# Patient Record
(demographics unavailable — no encounter records)

---

## 2019-01-08 NOTE — NUR
ROOM AIR, NO RESP DISTRESS. RIGHT GROIN 5F EXOSEAL CDI, NO BLEEDING
OR HEMATOAM NOTED. NO C/O PAIN OR NAUSEA. VSS. FAMILY AT BEDSIDE,
CALL LIGHT WITHIN REACH.

## 2019-01-08 NOTE — NUR
TAKEN OUT VIA WHEELCHAIR BY CATH TEAM MEMBER. LEFT FACILITY WITH
FAMILY AND ALL PERSONAL BELONGINGS.

## 2019-01-08 NOTE — NUR
VOIDED 600CC INTO BEDPAN. RIGHT GROIN 5F EXOSEAL CDI, NO BLEEDING OR
HEMATOMA NOTED. NO C/O AT THIS TIME. VSS. WILL CONTINUE TO MONITOR.

## 2019-01-08 NOTE — NUR
HOB ELEVATED 30 DEGREES. RIGHT GROIN 5F EXOSEAL CDI, NO BLEEDING
NOTED. SIPPING ON DRINK AND EATING SANDWICH WITH NO C/O NAUSEA. VSS.
CALL LIGHT WITHIN REACH.

## 2019-01-08 NOTE — HEMODYNAMI
PATIENT:TELLY PRATER                                  MEDICAL RECORD: [id number]
: 04/15/60                                            LOCATION:D.CAT          
ACCT# [id number]                                       ADMISSION DATE: 19
 
 
 Generatedon:201914:55
Patient name: TELLY PRATER Patient #: [id number] Visit #: [id number] SSN: : 4
/15/1960 Date
of study: 2019
Page: Of
Hemodynamic Procedure Report
****************************
Patient Data
Patient Demographics
Procedure consent was obtained
First Name: TELLY           Gender: Female
Last Name: MOI             : 1960
[facility] Initial: BRYANT        Age: 58 year(s)
Patient #: [id number]       Race: Unknown
Visit #: [id number]
Accession #:
[id number]
Additional ID: 
Contact details
Address: [address] [place] [address]     Phone: [phone]
State: [place]
City: [place]
Zip code: [address]
Past Medical History
Allergies
Allergen        Reaction        Date         Comments
Reported
Other allergy                   2019     SULFA, MORPHINE,
NICOTINE
Admission
Admission Data
Admission Date: 2019    Admission Time: 11:34
Height (in.): 65            BSA: 1.73 (m2)
Height (cm.): 165.1         BMI: 24.3 (kg/m2)
Weight (lbs.): 146
Weight (kg.): 66.22
Lab Results
Lab Result Date: 2019   Lab Result Time: 0:00
Biochemistry
Name         Units    Result                Min      Max
BUN          mg/dl    13       --(--*-)--   7        18
Creatinine   mg/dl    1        --(--*-)--   0.6      1.3
CBC
Name         Units    Result                Min      Max
Hemoglobin   g/dl     14.1     --(*---)--   13.5     17.5
Procedure
Procedure Types
Cath Procedure
Diagnostic Procedure
LHC
LHC w/Coronaries w/Grafts
Sedation Charges
Moderate Sedation up to 15 minutes
Procedure Description
 
Procedure Date
Procedure Date: 2019
Procedure Start Time: 14:32
Procedure End Time: 14:50
Procedure Staff
Name                            Function
Bebo Pulliam MD                   Performing Physician
Silvia Willett RT               Monitor
Homer Hunt RT                  Scrub
Sophie Mock RN                  Nurse
Procedure Data
Cath Procedure
Fluoroscopy
Diagnostic fluoroscopy      Total fluoroscopy Time: 3.7
time: 3.7 min               min
Diagnostic fluoroscopy      Total fluoroscopy dose: 701
dose: 701 mGy               mGy
Contrast Material
Contrast Material Type                       Amount (ml)
Isovue 300                                   121
Entry Location
Entry     Primary  Successful  Side  Size  Upsize Upsize Entry    Closure Succes
sful  Closure
Location                             (Fr)  1 (Fr) 2 (Fr) Remarks  Device        
      Remarks
Femoral                        Right 5 Fr                         Exoseal
artery
Estimated blood loss: 5 ml
Diagnostic catheters
Device Type               Used For           End Catheter
Placement
MULTIPACK JL 4.0 5Fr      Procedure
catheter
DIAGNOSTIC AR MOD 5Fr     Procedure
Catheter (783389X)
DIAGNOSTIC IM 5Fr         Procedure
catheter (673357S)
MULTIPACK Pigtail 5 Fr    Procedure
catheter
MULTIPACK JL 4.0 5Fr      Procedure
catheter
Procedure Complications
No complications
Procedure Medications
Medication           Administration Route Dosage
Oxygen               etCO2 Nasal cannula  2 l/min
Lidocaine 2%         added to field       20
Heparin Flush Bag    added to field       2 bags
(1000units/500ml NS)
0.9% NaCl            I.V.                 100 ml/hr
Versed               I.V.                 2 mg
Fentanyl             I.V.                 100 mcg
Versed               I.V.                 1 mg
Fentanyl             I.V.                 50 mcg
Versed               I.V.                 1 mg
Fentanyl             I.V.                 50 mcg
Versed               I.V.                 1 mg
Hemodynamics
Rest
BSA: 1.73 (m2) HGB: 14.1 (g/dl) O2 Consumption: Estimated: 159.86 (ml/min) O2 Co
 
nsumption
indexed: Estimated:92.4 (ml/min/m) Heart Rate: 62 (bpm)
Pressure Samples
Time     Site     Value (mmHg) Purpose      Heart      Use
Rate(bpm)
14:44    LV       130/9,21     Snapshot     73
Gradients
Valve  Time  Site Site Mean    SEP/DFP    Peak To Heart  Use
1    2    (mmHg)  (sec/min)  Peak    Rate
(mmHg)  (bpm)
Aortic 14:44 LV   AO                              73
Snapshots
Pre Cath      Intra         NCS           Post Cath
Vital Signs
Time     Heart  Resp   SPO2 etCO2   NIBP (mmHg) Rhythm  Pain    Sedation
Rate   (ipm)  (%)  (mmHg)                      Status  Level
(bpm)
14:16:50 62     18     99   42.3    165/97(140) NSR     0 (11)  10(A)
, No
pain
14:21:06 63     17     100  41.5    163/88(135) NSR     0 (11)  10(A)
, No
pain
14:25:22 65     22     95   30.9    141/87(115) NSR     0 (11)  10(A)
, No
pain
14:29:30 66     13     98   27.9    139/90(119) NSR     0 (11)  10(A)
, No
pain
14:33:36 69     16     97   9.8     130/89(115) NSR     0 (11)  9(A)
, No
pain
14:37:37 71     14     97   24.9    142/98(117) NSR     0 (11)  9(A)
, No
pain
14:41:43 73     13     97   30.2    130/92(108) NSR     0 (11)  9(A)
, No
pain
14:45:51 73     14     98   34.7    124/80(108) NSR     0 (11)  9(A)
, No
pain
14:49:55 75     18     96   30      117/83(107) NSR     0 (11)  10(A)
, No
pain
Medications
Time     Medication       Route   Dose  Verified Delivered Reason     Notes  Eff
ectiveness
by       by
14:17:28 Oxygen           etCO2   2     Bebo     Buffie    used for
Nasal   l/min Heraclio Mock RN   procedure
cannula
14:17:34 Lidocaine 2%     added   20ml  Bebo     Bebo      for local
to      vial  Heraclio Pulliam MD  anesthetic
field
14:17:42 Heparin Flush    added   2     Bebo     Bebo      used for
Bag              to      bags  Heraclio Pulliam MD  procedure
(1000units/500ml field
NS)
14:18:08 0.9% NaCl        I.V.    100   Bebo     Buffie    for
ml/hr Heraclio Mock RN   sedation
 
14:22:51 Versed           I.V.    2 mg  Bebo     Buffie    for
Heraclio Mock RN   sedation
14:22:58 Fentanyl         I.V.    100   Bebo     Buffie    for
mcg   Heraclio Mock RN   sedation
14:29:09 Versed           I.V.    1 mg  Bebo     Buffie    for
Heraclio Mock RN   sedation
14:29:13 Fentanyl         I.V.    50    Bebo     Buffie    for
mcg   Heraclio Mock RN   sedation
14:33:26 Versed           I.V.    1 mg  Bebo     Buffie    for
Heraclio Mock RN   sedation
14:33:29 Fentanyl         I.V.    50    Bebo     Buffie    for
mcg   Heraclio Mock RN   sedation
14:47:09 Versed           I.V.    1 mg  Bebo     Buffie    for
Heraclio Mock RN   sedation
Procedure Log
Time     Note
13:36:53 Signed procedure consent form obtained from patient.
13:36:59 Diagnostic Cath status Elective
13:37:00 Time tracking: Regular hours (M-F 7:00 - 5:00)
13:37:05 Plan of Care:Hemodynamics will remain stable., Cardiac
rhythm will remain stable., Comfort level will be
maintained., Respiratory function will remain
adequate., Patient/ family verbilizes understanding of
procedure., Procedure tolerated without complication.,
Recovers from procedure without complications..
13:44:48 Patient allergic to Other allergySULFA, MORPHINE,
NICOTINE
13:47:59 Lab Result : BUN 13 mg/dl
13:47:59 Lab Result : Hemoglobin 14.1 g/dl
13:47:59 Lab Result : Creatinine 1 mg/dl
13:58:32 Sophie Mock RN sent for patient. Start room use.
14:07:32 Patient received from Pre/Post Procedure Room to CCL 2
Alert and oriented. Tansferred to table in Supine
position.
14:07:34 Warm blankets applied, and keith hugger turned on for
patient comfort.
14:07:34 Correct patient and procedure confirmed by team.
14:07:36 ECG and BP/O2 sat monitors applied to patient.
14:15:45 Vital chart was started
14:15:49 Full Disclosure recording started
14:15:56 H&P Date Dictated: 2019 Within 30 days and on
chart., H&P Addendum completed by physician on day of
procedure. (MUST COMPLETE FOR ALL OUTPATIENTS).
14:17:28 Oxygen 2 l/min etCO2 Nasal cannula was administered by
Sophie Mock RN; used for procedure;
14:17:34 Lidocaine 2% 20ml vial added to field was administered
by Bebo Pulliam MD; for local anesthetic;
14:17:42 Heparin Flush Bag (1000units/500ml NS) 2 bags added to
field was administered by Bebo Pulliam MD; used for
procedure;
14:18:08 0.9% NaCl 100 ml/hr I.V. was administered by Sophie Mock RN; for sedation;
14:18:42 Rhythm: sinus rhythm
14:18:46 Baseline sample Acquired.
14:18:47 Pre-procedure instructions explained to patient.
14:18:48 Pre-op teaching completed and patient verbalized
understanding.
14:18:49 Family in patients room.
14:18:52 Patient NPO since Midnight.
14:18:57 Is patient on blood thinner?No
 
14:18:59 Patient diabetic? No.
14:19:02 Previous problem with sedation/anesthesia? No ?
14:19:02 Snore? Yes
14:19:03 Sleep apnea? No
14:19:04 Deviated septum? No
14:19:05 Opens mouth fully? Yes
14:19:06 Sticks out tongue? Yes
14:19:08 Airway obstruction? Yes COPD
14:19:11 Dentures? Yes OUT
14:19:14 Pre procedure: right dorsailis pedis pulse 1+
Palpable, but thready & weak; easily obliterated
14:19:16 Patient pain scale 0/10 ?.
14:19:22 IV patent on arrival in left forearm with 0.9% NaCl at
[facility].
14:19:24 Lab results completed and on chart.
14:19:26 Right groin area was prepped with chlora-prep and
draped in sterile fashion
14:19:27 Alarms reviewed by R. N.
14:19:27 Sharps counted by scrub and verified by R.N.
14:19:30 Use device set Femoral Dx
14:19:31 ACIST Syringe (77184) opened to sterile field.
14:19:34 Bag Decanter (2002S) opened to sterile field.
14:19:35 ACIST Hand Control (81409) opened to sterile field.
14:19:35 ACIST Manifold (76443) opened to sterile field.
14:19:36 Tegaderm 4 x 4 (1626W) opened to sterile field.
14:19:38 Medline Cath Pack ([id number]) opened to sterile field.
14:19:38 DIAGNOSTIC WIRE .035 260cm J wire (500509) opened to
sterile field.
14:19:40 DIAGNOSTIC Multipack 5Fr catheter set (NA1996) opened
to sterile field.
14:19:41 SHEATH 5FR [place] (NPY030) opened to sterile field.
14:22:23 --------ALL STOP TIME OUT------
14:22:24 Final Timeout: patient, procedure, and site verified
with staff and physician. All members of the team are
in agreement.
14:22:26 Right groin site verified by team.
14:22:29 Physical assessment completed. ASA score P 2 - A
patient with mild systemic disease as per Bebo Pulliam MD.
14:22:32 Sedation plan: IV Moderate Sedation Medication:Versed,
Fentanyl
14::51 Versed 2 mg I.V. was administered by Sophie Mock RN;
for sedation;
14::58 Fentanyl 100 mcg I.V. was administered by Sophie Mcok
RN; for sedation;
14:23:48 Procedure type changed to Cath procedure, Diagnostic
procedure, LHC, LHC w/Coronaries w/Grafts, Sedation
Charges, Moderate Sedation up to 15 minutes
14:23:58 Patient Height : 65 inches
14:24:05 Patient Weight : 146 lbs
14:26:02 Zero performed for pressure channel P1
14:29:09 Versed 1 mg I.V. was administered by Sophie Mock RN;
for sedation;
14:29:13 Fentanyl 50 mcg I.V. was administered by Sophie Mock
RN; for sedation;
14:31:20 Procedure started.
14:32:15 Local anesthetic to right femoral artery with
Lidocaine 2% by Bebo Pulliam MD.**INITIAL ACCESS ONLY**
14:33:26 Versed 1 mg I.V. was administered by Sophie Mock RN;
for sedation;
 
14::29 Fentanyl 50 mcg I.V. was administered by Sophie Mock
RN; for sedation;
14:33:29 A 5 Fr sheath was inserted into the Right Femoral
artery
14:33:45 A MULTIPACK JL 4.0 5Fr catheter was advanced over the
wire and used for Procedure.
14:35:20 LCA angiography performed.
14:35:27 Catheter exchanged over wire.
14:35:55 A DIAGNOSTIC AR MOD 5Fr Catheter ([id number]) was
advanced over the wire and used for Procedure.
14:36:48 SVG to Diag angiography performed.
14:38:22 RCA angiography performed.
14:39:09 SVG to RCA angiography performed.
14:39:41 Catheter exchanged over wire.
14:40:45 A DIAGNOSTIC IM 5Fr catheter (145965M) was advanced
over the wire and used for Procedure.
14:41:59 NO LIMA VISUALIZED
14:42:12 Catheter exchanged over wire.
14:43:54 A MULTIPACK Pigtail 5 Fr catheter was advanced over
the wire and used for Procedure.
14:44:00 LV gram done using CASTELLANOS
14:44:02 Injector settings: Ml/sec: 7, Volume: 15,
14:44:13 LV hemodynamics recorded.
14:44:23 EF : 60 %
14:44:48 Catheter exchanged over wire.
14:45:19 A MULTIPACK JL 4.0 5Fr catheter was advanced over the
wire and used for Procedure.
14:46:47 LCA angiography performed.
14:47:09 Versed 1 mg I.V. was administered by Sophie Mock RN;
for sedation;
14:47:48 Catheter removed.
14:47:51 EXOSEAL 5Fr () opened to sterile field.
14:48:57 Sheath removed intact; hemostasis achieved with
Exoseal to the Right Femoral artery.
14:49:05 Procedure ended.(Physican Out)
14:49:39 Fluoroscopy time 03.70 minutes.
14:49:43 Fluoroscopy dose: 701 mGy
14:49:43 Flurop Dose total: 701
14:49:46 Contrast amount:Isovue 300 121ml.
14:49:47 Sharps counted by scrub and verified by R.N.
14:49:50 Post-op/insertion site Right Femoral artery dressed
using a 4 x 4 and Tegaderm.
14:49:54 Post-procedure physical assessment completed. ASA
score P 2 - A patient with mild systemic disease as
per Bebo Pulliam MD.
14:49:57 Post procedure rhythm: sinus rhythm
14:50:00 Estimated blood loss: 5 ml
14:50:01 Post procedure instruction explained to
patient.Patient verbalizes understanding.
14:50:02 Patient needs reinforcement of post procedure
teaching.
14:50:30 Procedure and supply charges have been captured,
reviewed, submitted and are correct.
14:50:32 Procedure Complication : No complications
14:50:34 Vital chart was stopped
14:50:34 See physician's report for complete and final results.
14:50:35 Report given to Pre/Post Procedure Room.
14:50:38 Patient transfered to Pre/Post Procedure Room with
Bed.
14:50:39 Procedure ended.
 
14:50:39 Full Disclosure recording stopped
14:50:44 End room use (Document Last)
Device Usage
Item Name   Manufacture  Quantity  Catalog    Hospital Part    Current Minimal L
ot# /
Number     Charge   Number  Stock   Stock   Serial#
Code
[facility]        1         95957      [id number]   973400  947386  20
Syringe     Medical
(90816)     Systems Inc
Bag         Microtek     1         S      737074   27285   202515  5
Decanter    Medical Inc.
()
ACIST Hand  Acist        1         01488      574196   956996  656180  5
Control     Medical
(94152)     Systems Inc
ACIST       Acist        1         96091      276918   322627  596723  5
Manifold    Medical
(85440)     Systems Inc
Tegaderm 4  3M           1         1626W      262819   496071  196187  5
x 4 (1626W)
Medline     Medline      1         [id number]  664460   21817   253103  5
Cath Pack
([id number])
DIAGNOSTIC  St Des      1         043871     141082   692099  329580  30
WIRE .035
260cm J
wire
(269561)
DIAGNOSTIC  Cardinal     1         UM6879     063910   93784   375597  30
Multipack   Health
5Fr
catheter
set
(LT8798)
SHEATH 5FR  Terumo       1         BPV698     130745   230761  366069  5
[place]
(OTR678)
MULTIPACK   Cardinal     1                                     537507  5
JL 4.0 5Fr  Health
catheter
DIAGNOSTIC  Cardinal     1         812919H    391646   724100  583201  15
AR MOD 5Fr  Health
Catheter
(472954X)
DIAGNOSTIC  Cardinal     1         141039P    850682   640323  953817  5
IM 5Fr      Health
catheter
(301103B)
MULTIPACK   Cardinal     1                                     146439  5
Pigtail 5   Health
Fr catheter
EXOSEAL 5Fr Cardinal     1               336008   044758  554181  10
()     Health
Signature Audit [place]
Stage           Time        Signature      Unsigned
Intra-Procedure 2019    Silvia Willett
2:55:18 PM  RT(R)
Signatures
Monitor : Silvia Willett Signature :
 
RT                       ______________________________
Date : ______________ Time :
______________
 
 
 
 
 
 
 
 
 
 
 
 
 
 
 
 
 
 
 
 
 
 
 
 
 
 
 
 
 
 
 
 
 
 
 
 
 
 
 
 
 
 
 
 
 
 
 
 
 
 
 
 
[address]                           
[place] [address]

## 2019-01-22 NOTE — CN
PATIENT NAME:TELLY PRATER                               MEDICAL RECORD: [id number]
: 04/15/60                                              LOCATION:D.M3 D.1209
ADMIT DATE: 19                                       ACCOUNT: [id number]
CONSULTING PHYSICIAN:    LIZBETH CULP MD             
                                               
REFERRING PHYSICIAN:     BJ FOLEY MD               
 
 
DATE OF CONSULTATION:  2019
 
CARDIOLOGY CONSULT
 
DIAGNOSES:
1.  Hypotension.
2.  COPD.
3.  Smoking history.
4.  Hyperlipidemia.
5.  History of systemic hypertension.
6.  Syncope.
7.  Coronary artery disease.
8.  Status post coronary bypass graft surgery.
 
HISTORY:  Mrs. Prater is known to us with past history of coronary artery disease,
status post coronary bypass graft surgery, and recently underwent cardiac
catheterization showing patency of all her grafts.  She came in with syncope and
was found to be hypotensive.  She is on multiple blood pressure medications
including metoprolol, Cozaar, and amlodipine.  Initially, her systolic blood
pressure was in the 60s.  Now, her systolic blood pressure is in the 100 range. 
She has had an echocardiogram.  This was normal with an ejection fraction in the
60% range with normal valvular structures.
 
OVERALL IMPRESSION:  Hypotension, possibly secondary to sepsis, possibly
secondary to overmedication with blood pressure medication.  At this time, no
cardiac workup is necessary.  This is not cardiac in nature.  I would restart
the blood pressure medications only as her blood pressure tolerates.
 
TRANSINT:[id number] Voice Confirmation ID: [id number] DOCUMENT ID: [id number]
                                           
                                           LIZBETH CULP MD             
 
 
 
Electronically Signed by LIZBETH CULP on 19 at 1020
 
 
 
 
 
 
 
CC:                                                             [id number]
DICTATION DATE: 19 1257     :     19 1513      ADM IN  
                                                                              
[address] [place] [address]

## 2019-01-22 NOTE — MORECARE
CASE MANAGEMENT DISCHARGE SUMMARY
 
 
PATIENT: TELLY PRATER                     UNIT: [id number]
ACCOUNT#: [id number]                       ADM DATE: 19
AGE: 58     : 04/15/60  SEX: F            ROOM/BED: D.1209    
AUTHOR: KEVINDOC                             PHYSICIAN:                               
 
REFERRING PHYSICIAN: BJ FOLEY MD               
DATE OF SERVICE: 19
Discharge Plan
 
 
Patient Name: TELLY PRATER
Facility: [facility]:[place]
Encounter #: [id number]
Medical Record #: [id number]
: 1960
Planned Disposition: 
Anticipated Discharge Date: 
 
Discharge Date: 
Expected LOS: 
Initial Reviewer: [id number]
Initial Review Date: 2019
Generated: 19  12:48 pm 
Comments
 
DCP- Discharge Planning
 
Updated by [id number]: Marisol Hebert on 19  10:45 am CT
Patient Name: TELLY PRATER                                     
Admission Status: ER   
Accout number: [id number]                              
Admission Date: 2019   
: 1960                                                        
Admission Diagnosis:   
Attending: BJ FOLEY                                                
Current LOS:  1   
  
Anticipated DC Date:    
Planned Disposition:    
Primary Insurance: MEDICAID [place]   
  
  
Discharge Planning Comments: CM MET WITH PATIENT ABOUT DC PLANNING/NEEDS. 
STATES ONLY NEED IS THAT HER NEBULIZER WAS STOLEN AND NEEDS ANOTHER ONE. 
[facility] [place] PL[facility] TO [place] TO HOME WITH HER SISTER TRISTAN. STATES GOES TO [facility]. SHE REQUESTS THAT MD HERE WRITES CERTAIN 
PRESCRIPTIONS BECAUSE SHE CAN'T GET IN TO [facility] UNTIL THE 16TH OF NEXT 
 
MONTH. CM WILL FOLLOW AND ASSIST AS NEEDED.   
  
  
  
  
  
  
: Marisol Hebert
 DCPIA - Discharge Planning Initial Assessment
 
Updated by [id number]: Marisol Hebert on 19  11:40 am
*  Is the patient Alert and Oriented?
Yes
*  PCP
[facility]
*  Pharmacy
[facility] [address]
*  Preadmission Environment
Home with Family
*  ADLs
Independent
*  Other Equipment
NEBULIZER BUT STATES WAS STOLEN.
*  List name and contact numbers for known caregivers / representatives who 
currently or will assist patient after discharge:
SISTER HUDSON, [phone]
*  Please name any agencies selected above.
[facility].
*  Additional services required to return to the preadmission environment?
No
*  Can the patient safely return to the preadmission environment?
Yes
*  Has this patient been hospitalized within the prior 30 days at any 
hospital?
No
 
 
 
 
 
 
 
Last DP export: 19  10:40 a
Patient Name: TELLY PRATER
 
Encounter #: [id number]
Page [id number]
 
 
 
 
 
Electronically Signed by GRAHAM BROWN on 19 at 1148
 
 
 
 
 
 
**All edits/amendments must be made on the electronic document**
 
DICTATION DATE: 19     : KIRA  197     
RPT#: [id number]                                DC DATE:        
                                               STATUS: ADM IN  
[facility]
 [place] [address]
***END OF REPORT***

## 2019-01-22 NOTE — MORECARE
CASE MANAGEMENT DISCHARGE SUMMARY
 
 
PATIENT: TELLY PRATER                     UNIT: [id number]
ACCOUNT#: [id number]                       ADM DATE: 19
AGE: 58     : 04/15/60  SEX: F            ROOM/BED: D.1209    
AUTHOR: GRAHAM BROWN                             PHYSICIAN:                               
 
REFERRING PHYSICIAN: BJ FOLEY MD               
DATE OF SERVICE: 19
Discharge Plan
 
 
Patient Name: TELLY PRATER
Facility: [facility]:[place]
Encounter #: [id number]
Medical Record #: [id number]
: 1960
Planned Disposition: 
Anticipated Discharge Date: 
 
Discharge Date: 
Expected LOS: 
Initial Reviewer: [id number]
Initial Review Date: 2019
Generated: 19  12:40 pm 
  
 
 
 
 
 
 
 
Patient Name: TELLY PRATER
 
Encounter #: [id number]
Page [id number]
 
 
 
 
 
Electronically Signed by GRAHAM BROWN on 19 at 1140
 
 
 
 
 
 
**All edits/amendments must be made on the electronic document**
 
DICTATION DATE: 19 1139     : KIRA  19 1139     
RPT#: [id number]                                DC DATE:        
                                               STATUS: ADM IN  
[facility]
[address] [place] [address]
***END OF REPORT***

## 2019-01-22 NOTE — EC
PATIENT:TELLY PRATER                 DATE OF SERVICE: 01/21/19
SEX: F                                  MEDICAL RECORD: [id number]
DATE OF BIRTH: 04/15/60                        LOCATION:D.      D.120
AGE OF PATIENT: 58                             ADMISSION DATE: 01/21/19
 
REFERRING PHYSICIAN:                               
 
INTERPRETING PHYSICIAN: LIZBETH CULP MD             
 
 
 
                             ECHOCARDIOGRAM REPORT
  ECHO CHARGES 4               ECHO COMPLETE                 Date: 01/21/19
 
 
 
CLINICAL DIAGNOSIS: CAD                           
 
                         ECHOCARDIOGRAPHIC MEASUREMENTS
      (adult normal given)
   AC root (d.<3.7cm) 3.7  cm   LV Septum d (<1.2 cm> 1.1  cm
      Valve Excursion 1.8  cm     LV Septum (systole) 1.3  cm
Left Atria (s.<4.0cm> 3.5  cm          LVPW d(<1.2cm) 1.4  cm
        RV (d.<2.3cm) 2.8  cm           LVPW (sytole) 1.5  cm
  LV diastole(<5.6CM) 5.7  cm       MV E-F(>70mm/sec)      cm
           LV systole 4.2  cm           LVOT Diameter 1.9  cm
       MV exc.(>10mm)      cm
Est.ejection fraction (50-75%)     %
 
   DOPPLER:
     LVIT      cm/sec A 85   cm/sec E 51    cm/sec
       LA      cm/sec      RVSP 25.5 mmHg
     LVOT 131  cm/sec   AOP1/2T      m/s
  Asc. Ao 164  cm/sec
     RVOT 74   cm/sec
       RA      cm/sec
         cm/sec
 AV Gradient Peak 10.7 mmHg  AV Mean 5.4  mmHg  AV Area 2.3  cm
 MV Gradient Peak 3.4  mmHg  MV Mean 1.9  mmHg  MV Area      cm
   COMMENTS:                                              
 
 
 Cardiac Sonographer: Catalina BRODY             
      Cardiologist: Jose Pulliam                
             TAPE# PACS           
                                       Pericardial Effusion N                        
 
 
DATE OF SERVICE:  01/21/2019
 
FINDINGS:
1.  Left ventricular chamber size is within normal limits.  Left ventricular
systolic function is normal.  Overall ejection fraction estimated at 60%.
2.  Left atrium, right atrium, and right ventricle chamber sizes are within
normal limits.
3.  Valvular structures have normal structure and motion.
4.  Doppler interrogation reveals mild tricuspid regurgitation, no other
valvular insufficiency or stenosis.  Pulmonary systolic pressure is estimated at
 
 
 
ECHOCARDIOGRAM REPORT                          [id number]    TELLY PRATER         
 
 
25 mmHg.
5.  No evidence of pericardial effusion or left ventricular thrombus.
 
TRANSINT:[id number] Voice Confirmation ID: [id number] DOCUMENT ID: [id number]
                                           
                                           LIZBETH CULP MD             
 
 
 
Electronically Signed by LIZBETH CULP on 01/22/19 at 1023
 
 
 
 
 
 
 
 
 
 
 
 
 
 
 
 
 
 
 
 
 
 
 
 
 
 
 
 
 
 
 
 
 
 
 
 
CC:                                                             [id number]
DICTATION DATE: 01/21/19 1142     :     01/21/19 1343      ADM IN  
                                                                              
[place] [id number] [place] [id number]

## 2019-01-22 NOTE — MORECARE
CASE MANAGEMENT DISCHARGE SUMMARY
 
 
PATIENT: TELLY PRATER                     UNIT: [id number]
ACCOUNT#: [id number]                       ADM DATE: 19
AGE: 58     : 04/15/60  SEX: F            ROOM/BED: D.1209    
AUTHOR: KEVIN,DOC                             PHYSICIAN:                               
 
REFERRING PHYSICIAN: BJ FOLEY MD               
DATE OF SERVICE: 19
Discharge Plan
 
 
Patient Name: TELLY PRATER
Facility: [facility]
Encounter #: [id number]
Medical Record #: [id number]
: 1960
Planned Disposition: Home
Anticipated Discharge Date: 19
 
Discharge Date: 2019
Expected LOS: 1
Initial Reviewer: [id number]
Initial Review Date: 2019
Generated: 19   5:27 pm 
Comments
 
DCP- Discharge Planning
 
Updated by [id number]: Marisol Hebert on 19  10:45 am CT
Patient Name: TELLY PRATER                                     
Admission Status: ER   
Accout number: [id number]                              
Admission Date: 2019   
: 1960                                                        
Admission Diagnosis:   
Attending: BJ FOLEY                                                
Current LOS:  1   
  
Anticipated DC Date:    
Planned Disposition:    
Primary Insurance: MEDICAID [place]   
  
  
Discharge Planning Comments: CM MET WITH PATIENT ABOUT DC PLANNING/NEEDS. 
STATES ONLY NEED IS THAT HER NEBULIZER WAS STOLEN AND NEEDS ANOTHER ONE. 
STATES PLANS TO DC TO HOME WITH HER SISTER TRISTAN. STATES GOES TO [facility]. SHE REQUESTS THAT MD HERE WRITES CERTAIN 
PRESCRIPTIONS BECAUSE SHE CAN'T GET IN TO [facility] UNTIL THE 16TH OF NEXT 
 
MONTH. CM WILL FOLLOW AND ASSIST AS NEEDED.   
  
  
  
  
  
  
: Marisol Hebert
 DCPIA - Discharge Planning Initial Assessment
 
Updated by [id number]: Marisol Hebert on 19  11:40 am
*  Is the patient Alert and Oriented?
Yes
*  PCP
[facility]
*  Ph[facility] [address]
*  Preadmission Environment
Home with Family
*  ADLs
Independent
*  Other Equipment
NEBULIZER BUT STATES WAS STOLEN.
*  List name and contact numbers for known caregivers / representatives who 
currently or will assist patient after discharge:
TRISTAN SISTER, [phone]
*  Please name any agencies selected above.
[facility].
*  Additional services required to return to the preadmission environment?
No
*  Can the patient safely return to the preadmission environment?
Yes
*  Has this patient been hospitalized within the prior 30 days at any 
hospital?
No
 
 
 
 
 
 
 
Last DP export: 19  10:48 a
Patient Name: TELLY PRATER
 
Encounter #: [id number]
Page [id number]
 
 
 
 
 
Electronically Signed by GRAHAM BROWN on 19 at 1627
 
 
 
 
 
 
**All edits/amendments must be made on the electronic document**
 
DICTATION DATE: 19     : KIRA  19     
RPT#: [id number]                                DC DATE:19
                                               STATUS: DIS IN  
[facility]
[address] [place] [address]
***END OF REPORT***

## 2019-06-08 NOTE — NUR
PT ARRIVED TO FLOOR VIA STRETCHER. TRANSFERED TO BED, TOLERATED WELL. STATES
PAIN IN LEFT HIP 5/10, JUST RECIEVED DILAUDID. IV RIGHT WRIST SL, FLUSHES
EASILY. MORALES DRAINING CLEAR YELLOW URINE. PT O2 SAT 85%, WOULD ONLY COME AS
HIGH AS 90% WITH DEEP BREATHS. CALLED RESP TO BEDSIDE. PLACED ON 2L/NC AND PT
GIVEN BREATHING TRX. O2O 92% AFTER. PT AWARE OF NPO AFTER MIDNIGHT, CONSENTS
SIGNED AT THIS TIME. CL IN REACH, WILL CTM

## 2019-06-09 NOTE — NUR
PT LYING IN BED ASLEEP WITHOUT DISTRESS. WAKES TO VERBAL STIMULI, ALERT AND
ORIENTED. STATES SOME PAIN, JUST RECIEVED PAIN MEDICINE. VSS. IV RIGHT HAND
INFUSING NS @ 50. O2 3L/NC. MORALES DRAINING CLEAR YELLOW URINE. AUBREE HOSE AND
SCDS ON BILAT. FALL PRECAUTIONS IN PLACE. POSEY ON. CL IN REACH, WILL CTM

## 2019-06-09 NOTE — NUR
AT 1440 HEAD TO TOE ASSESSMENT REVEALED PATIENT'S HEAD WAS COVERED
WITH HEAD LICE. NOTIFIED ANESTHESIA, BECKY BRANTLEY CRNA AND ROXY BREWER RN. TERMINAL CLEAN IN OR WAS ORDERED. MAILE BLOUNT CAME
TO PACU AND MADE ROUNDS ON THIS PATIENT AND I INFORMED HER OF THE
ABOVE. SHE THEN ORDERED THE APPROPRIATE LICE TREATMENT. I ALSO
INFORMED AMBROCIO ROBERTS LPN ON MED/SURG ABOVE AND AT THIS TIME SHE PLACED
THIS PATIENT ON CONTACT ISOLATION. WILL CONTINUE TO MONITOR.

## 2019-06-10 NOTE — OP
PATIENT NAME:  TELLY JACOBSON                           MEDICAL RECORD: [id number]
:04/15/60                                             LOCATION:D.MS HI2211
                                                         ADMISSION DATE:19
SURGEON:  VALENTIN HE DO         
 
 
DATE OF OPERATION:  2019
 
PROCEDURE PERFORMED:  Left total hip arthroplasty.
 
PREOPERATIVE DIAGNOSIS:  Left displaced femoral neck fracture.
 
POSTOPERATIVE DIAGNOSIS:  Left displaced femoral neck fracture.
 
INDICATIONS:  Ms. Jacobson is a 59-year-old female who fell yesterday while outside.
 She was having extreme amount of pain when she fell.  She was brought to the ER
and seen to have a femoral neck fracture on the left.  She was informed that we
could do a total hip.  She had total hip on the right side.  I informed that we
can do a total hip on her.  She was okay with that and okay with the risks
including infection, bleeding, damage to nerves and vessels, need for further
surgery, fracture, loosening, blood clots, and even death.  She signed the
consent.
 
SURGEON:  Valentin He DO
 
DESCRIPTION OF PROCEDURE:  The patient was taken to the operative suite, given
general anesthetic, and intubated.  She was then put over on the [place] table and
positioned.  The left hip was prepped and draped in sterile fashion, which was
prepped and draped.  The time-out was performed and everyone was in agreement
with the correct side, site, patient, and procedure.  She was given a gram of
Ancef preoperatively.  The incision then began over tensor fasciae latae muscle.
 Careful dissection was made down to the fascia of the tensor fasciae latae and
the fascia was incised.  It was taken anteriorly with muscle belly posteriorly,
opening up the interval of the rectus.  The rectus interval was then opened. 
Rectus was taken medially and tensor fasciae latae laterally, and then the
ascending branch of lateral femoral circumflex was encountered.  This was tied
off and then coagulated with the Aquamantys and cut with plasma blade.  The
capsule was then scraped off and exposed with retractors around the femoral
neck.  The capsule was then opened and tagged.  Hohmanns were put around the
femoral neck.  Neck cut was then made and the femoral head was removed.  The
pulvinar as well as labrum were removed off the acetabulum, pulling off inside
the acetabulum.  Any bleeding was coagulated at that time.  I then began reaming
under fluoro from 42 to 48, putting in a 48 cup, which fit very nicely.  It was
very solid and well fixed.  The femur was needed to be cut one more time as she
had a long femoral neck.  Neck cut was then redone and the bone was removed. 
Then, the femur was exposed.  Broaching began with a 4 and up to a 12.  Once the
12 was done, we reduced it with -6.  This fit very loosely and x-ray was taken. 
We then went to a standard and again femoral neck was kind of short.  I went to
+3 and it was short still.  Then, the 12 stem had become loose.  I then broached
to 13 and 13 fit very well.  I packed bone chips in the femoral canal just to
get a tighter fit with the final implant and to make it a little proud to get
more length off of the femur.  The 13 stem was then impacted into place and I
put a +6 neck on.  This was reduced and then x-rays were taken, which seemed to
be good length and very close to the right hip as far as length goes.  The wound
was then thoroughly irrigated and Surgicel powder, vancomycin and tobramycin
powder were placed in the wound.  The tensor fasciae latae fascia was then
closed in a running stitch with #1 Vicryl.  Then, the skin was closed with 2-0
Vicryl, inverted interrupted 4-0 Monocryl ran on the skin, and Prineo glue on
 
 
 
OPERATIVE REPORT                               [id number]    TELLY JACOBSON BRYANT         
 
 
the skin.  Telfa and Tegaderm were placed over that.  She was awakened and taken
to recovery in stable condition.
 
BLOOD LOSS:  Approximately 200 mL.
 
COMPLICATIONS:  None.
 
TRANSINT:[id number] Voice Confirmation ID: [id number] DOCUMENT ID: [id number]
                                           
                                           VALENTIN HE DO         
 
 
 
Electronically Signed by VALENTIN BENITEZ on 06/10/19 at 0737
 
 
 
 
 
 
 
 
 
 
 
 
 
 
 
 
 
 
 
 
 
 
 
 
 
 
 
 
 
 
 
 
 
CC:                                                             [id number]
DICTATION DATE: 19 1429     :     19      ADM IN  
                                                                              
[facility]                                          
[address] [id number]

## 2019-06-10 NOTE — NUR
LYING IN BED. ALERT AND ORIENTED X4. RESP EVEN AND NONLABORED. BBS EXP
WHEEZES. NONPROD COUGH NOTED. O2 @ 2L/NC. MORALES CATH PATENT AND DRAINING CLEAR
YELLOW URINE. SCDS ON BILAT. DRSG TO LT HIP IS C/D/I. AUBREE HOSE ON BILAT. PEDAL
PULSES WNL. DENIES PAIN AT THIS TIME. 1/2 NS @ 50 MLHR INFUSING IN RT FOREARM
WITHOUT DIFF. POSEY ALARM IN USE. SR ELEVATED X2. CL IN REACH.

## 2019-06-10 NOTE — NUR
PATIENT SITTING UP IN CHAIR PER PT. SCHEDULED XANAX AND PRN
10 MG OXYCODONE GIVEN FOR PAIN. NO OTHER NEEDS VOICED AT THIS TIME

## 2019-06-11 NOTE — NUR
LYING IN BED. AWAKENED FOR ASSESSMENT. DROWSY. ORIENTED X4. RESP EVEN AND
NONLABORED. O2 @ 3L/NC. DRSG NOTED TO LT HIP IS C/D/I. SCDS IN USE. MORALES CATH
PATENT AND DRAINING CLEAR YELLOW URINE. 1/2 NS @ 50 MLHR INFUSING IN RT
FOREARM WITHOUT DIFF. CONTACT ISO IN USE. DENIES PAIN. NO DISTRESS. SR
ELEVATED X2. CL IN REACH. POSEY ALARM IN USE.

## 2019-06-11 NOTE — MORECARE
CASE MANAGEMENT DISCHARGE SUMMARY
 
 
PATIENT: TELLY PRATER                     UNIT: [id number]
ACCOUNT#: [id number]                       ADM DATE: 19
AGE: 59     : 04/15/60  SEX: F            ROOM/BED: D.2211    
AUTHOR: KEVINDOC                             PHYSICIAN:                               
 
REFERRING PHYSICIAN: BJ FOLEY MD               
DATE OF SERVICE: 19
Discharge Plan
 
 
Patient Name: TELLY PRATER
Facility: [facility]
Encounter #: [id number]
Medical Record #: [id number]
: 1960
Planned Disposition: Home or Self Care
Anticipated Discharge Date: 
 
Discharge Date: 
Expected LOS: 
Initial Reviewer: [id number]
Initial Review Date: 2019
Generated: 19   2:23 pm 
Comments
 
DCP- Discharge Planning
 
Updated by [id number]: Gladys Werner on 19  12:22 pm CT
Patient Name: TELLY PRATER                                     
Admission Status: ER   
Accout number: [id number]                              
Admission Date: 2019   
: 1960                                                        
Admission Diagnosis:FRACTURE OF UNSP PART OF NECK OF LEFT FEMUR, INIT   
Attending: BJ FOLEY                                                
Current LOS:  3   
  
Anticipated DC Date:    
Planned Disposition: Home or Self Care   
Primary Insurance: MEDICAID [place]   
  
  
Discharge Planning Comments:   
  
CM met with patient to complete initial dc planning assessment.  CM educated 
patient on the CM role and verbal consent given by patient to complete 
assessment.   Patient lives at home with sister where she states she is 
 
independent with her care.  At discharge patient plans to return home  and 
feels this is a safe discharge. Tanja (sister) will be her  home when 
she is discharged.  CM discussed availability of home health, rehab services, 
and medical equipment.  She states that she has a nebulizer at home that she 
uses, she also states that she has home health, but I called [facility], [facility], 
[facility], [facility] home health and none of them have her on her service. Patient 
denied known discharge needs at this time. CM will continue to follow and 
will assist as needed with dc plans/needs.    
  
  
  
  
: Gladys Werner
 DCPIA - Discharge Planning Initial Assessment
 
Updated by [id number]: Gladys Werner on 19   1:19 pm
*  Is the patient Alert and Oriented?
Yes
*  How many steps to enter\exit or inside your home? ramp *  PCP [facility] *  Pharmacy [facility] [address]
*  Preadmission Environment
Home with Family
*  ADLs
Independent
*  Equipment
Nebulizer
*  List name and contact numbers for known caregivers / representatives who 
currently or will assist patient after discharge:
TANJA MCCAULEY (SISTER) [phone]
*  Verbal permission to speak to the caregivers and representatives has been 
obtained from the patient.
N/A
*  Community resources currently utilized
Other
*  Please name any agencies selected above.
STATES SHE HAS HOME HEALTH  I CALLED   [facility]  [facility]  [facility]  SHE 
IS NOT ON SERVICE WITH ANY OF THESE
 
 
 
 
 
*  Additional services required to return to the preadmission environment?
No
*  Can the patient safely return to the preadmission environment?
Yes
*  Has this patient been hospitalized within the prior 30 days at any 
hospital?
No
 
 
 
 
 
 
 
Patient Name: TELLY PRATER
Encounter #: [id number]
Page [phone]
 
 
 
 
 
Electronically Signed by GRAHAM BRWON on 19 at 1323
 
 
 
 
 
 
**All edits/amendments must be made on the electronic document**
 
DICTATION DATE: 19 1323     : KIRA  19 1323     
RPT#: [id number]                                DC DATE:        
                                               STATUS: ADM IN  
[facility]
[address] [place] [address]
***END OF REPORT***

## 2019-06-11 NOTE — NUR
PATIENT RECIEVED FROM PREVIOUS SHIFT RESTING IN BED, POST OP DAY 2 LEFT HIP
REPLACEMENT. DRESSING C/D/I. PATIENT REQUEST PAIN MEDICATION FOR LEFT HIP
PAIN. CL IN REACH NO OTHER NEEDS VOICED.

## 2019-06-12 NOTE — NUR
PT RESTING IN BED. AROUSED BY VERBAL STIMULI. ASSISTED PT WITH MOVING UP IN
BED AND SETTING UP TRAY. NO S/S OF ACUTE DISTRESS. CL IN PLACE.

## 2019-06-12 NOTE — NUR
PT RESTING IN BED. PULLED 750ML OFF  INCENTIVE SPIROMETER. EDUCATED THROUGHOUT
SHIFT WITH DEMONSTRATION RETURNED. NO S/S OF ACUTE DISTRESS. CL IN PLACE.

## 2019-06-12 NOTE — NUR
LYING IN BED. LETHARGIC. ORIENTED TO SELF AND PLACE ONLY. CONFUSED. RESTLESS.
POSEY ALARM ON FOR PT SAFETY. RESP IRREG, LABORED BUT TOOK O2 OFF. O2 @ 3L/NC
PLACED BACK ON PT. DRSG NOTED TO LT HIP. BBS EXP WHEEZED IN MARIAH AND DIMINISHED
THROUGHOUT. NON PROD COUGH NOTED. SCDS IN USE BILAT. MORALES CATH PATENT AND
DRAINING CLEAR YELLOW URINE. 1/2 NS @ 50 MLHR INFUSING IN RT FOREARM. SR
ELEVATED X2. CL IN REACH. TELEMETRY SHOWS SR WITH RATE OF 93.

## 2019-06-12 NOTE — NUR
RESTING IN BED WITH EYES CLOSED. RESP EVEN AND NONLABORED. HAS BEEN SLEEPING
MOST OF THIS SHIFT SO FAR. NO DISTRESS. CL IN REACH.

## 2019-06-12 NOTE — NUR
PT SPILLED SODA ON FLOOR. LETHARGIC. SPOKE WITH DR ABARCA ABOUT PT CONDITION.
VS STABLE. FSBS-179. NEW ORDERS FOR CT SCAN WITHOUT CONTRAST. ABGS STAT. HOLD
ALL PAIN MEDICATION AND XANAX. O2 @2 LITERS. NO S/S OF ACUTE DISTRESS. CL IN
PLACE.

## 2019-06-12 NOTE — NUR
128/[phone] ORAL. REPORTED TO HIREN GR. STAT CHEST XRAY. BLOOD
CULTURES X2. NO S/S OF ACUTE DISTRESS. CL IN PLACE.

## 2019-06-13 NOTE — NUR
PATIENT CONFUSED. PULLED OUT IV. RESITED TO THE LEFT FOREARM. PATIENT
APOLOGETIC. STATES SHE HAS MOMENTS OF CONFUSION. CURRENLTY AWARE OF TIME PLACE
AND SITUATION. STATES SHE DOES NOT WANT A PROLONGED STAY. VERY COMPLIENT. FALL
PRECAUTIONS IN PLACE. DENIES PAIN AT THIS TIME. HIGH FLOW CANNULA WITH 3 L.
CALL LIGHT WITHIN REACH.

## 2019-06-13 NOTE — NUR
HAS BEEN CONFUSED ALL NIGHT. TALKING AND YELLING OUT TO PEOPLE NOT PRESENT.
PULLING OFF O2. IV SITE IN RT FOREARM RED. RESTARTED IV IN LT HAND WITH 22 G
AFTER 4 ATTEMPTS BY SEVERAL STAFF MEMBERS. PT STILL LETHARGIC. POSEY ALARM ON.
CL IN REACH.

## 2019-06-13 NOTE — NUR
REC'D IN BED WITH EYES CLOSED EASILY TO AROUSED WHEN NAME IS CALLED. RESP EVEN
AND UNLABORED WITH NO DISTRESS NOTED. NO C/O NOTED OR VOICED. DENIES ANY PAIN
OR DISCOMFORT NOTED. ASSESMENT COMPLETED. C/L IN REACH AT BEDSIDE.

## 2019-06-14 NOTE — NUR
RESTING IN BED. ALERT AND ORIENTED X 3. LUNGS CLEAR BILATERALLY IN ALL FIELDS.
HEART SOUNDS S1 AND S2 HEARD IN ALL FIELDS. BOWEL SOUNDS ACTIVE X 4. SKIN
INTACT WITHOUT REDNESS. DENIES PAIN. DENIES NEEDS. BED LOW. FALL PRECAUTIONS
IN PLACE. CALL BELL AND PERSONAL ITEMS IN REACH. WILL CONTINUE TO MONITOR.

## 2019-06-14 NOTE — NUR
RESTING IN BED. DENIES NEEDS. BED LOW. FALL PRECAUTIONS IN PLACE. CALL BELL
AND PERSONAL ITEMS IN REACH.

## 2019-06-14 NOTE — NUR
RESTING COMFORTABLY. OXYGEN IN PLACE AND UNLABORED RESPIRATIONS AT THIS TIME.
MORALES CATHETER. CALL LIGHT WITHIN REACH.

## 2019-06-15 NOTE — NUR
ASSESSED AT THE BEGINNING OF THE SHIFT. PT WAS NOT HAPPY BECAUSE HER PAIN MEDS
WERE ON HOLD FOR HER LOW BLOOD PRESSURES. DR BLAIR WAS AT THE HOSPITAL AND HE
WAS APPROACHED TO RENEW HER XANAX SO SHE COULD SLEEP BETTER. AFTER THIS SHE
SEEMED TO BE HAPPIER. SHE HAS RESTED WELL THE REST OF THE NIGHT.

## 2019-06-15 NOTE — NUR
PT C/O ANXIETY AND PAIN 8/10. GAVE XANAX AND OXY IR PO. CONTACT ISOLATION
PRECAUTIONS. LEFT HIP DRESSING C/D/I. COMPLETE ASSESSMENT PER FLOW-SHEET. NO
OTHER NEEDS. WILL CONTINUE TO MONITOR.

## 2019-06-15 NOTE — NUR
AAOX4. SITTING UP WATCHING TV. PUT SCDS ON AND AUBREE HOSE. SKIN C/D/I.
NO S/S OF ACUTE DISTRESS. CL IN PLACE.

## 2019-06-16 NOTE — NUR
PT RESTING IN BED. AAOX4. CO OF HIP PAIN8/10. OXY IR 5 MG GIVEN PER MD ORDER.
NO S/S OF ACUTE DISTRESS. CL IN PLACE. 1530 CNA ASSISTED IN BATHING PT AND
RETREATING PT FOR HEADLICE AS ORDERED PER MD ORDER.

## 2019-06-16 NOTE — NUR
PT C/O LEFT HIP PAIN 8/10 AND ANXIETY. GAVE OXY IR 5 MG AND XANAX 1 MG PO. PT
ON 3L/NC. LEFT HIP DRESSING C/D/I. NO OTHER NEEDS. WILL CONTINUE TO MONITOR.

## 2019-06-17 NOTE — NUR
PT SITTING UP IN BED WITHOUT DISTRESS, ALERT AND ORIENTED. STATES PAIN 6/10,
WILL GIVE NORCO AS ORDERED. REFUSES SCDS AT THIS TIME. NO IV ACCESS, X3
ATTEMPTS MADE. CONTACT ISOLATION IN PLACE. DENIES NEEDS AT THIS TIME. CL IN
REACH, POSEY ON. WILL CTM

## 2019-06-17 NOTE — NUR
NUTRITION F/U
GOOD INTAKE BREAKFAST BUT HAVING NAUSEA AT LUNCH. WILL CONTINUE TO PROVIDE
DIET, MONITOR PO INTAKE.
RD FOLLOWING

## 2019-06-17 NOTE — NUR
AWAKE AND ALERT. ORIENTED X3. NO C/O AT THIS TIME. LUNGS HAVE CRACKLES AND
WHEEZES THROUGHOUT. NON PRODUCTIVE NOTED. SKIN IS INTACT WITHOUT REDNESS
EXCEPT INCISION TO LEFT HIP WHICH HAS A DRY INTACT DRESSING IN PLACE. IV TO
RIGHT FOREARM IS PATENT WITHOUT REDNESS AT INSERTION SITE. DENIES NEEDS.

## 2019-06-17 NOTE — NUR
UP TO BR WITH ONE PERSON MIN ASSIST AND RW. VOIDED 400CC CLEAR YELLOW URINE.
SPECIMEN SENT TO LAB PER ORDERS. REPOSITIONED IN BED FOR COMFORT.

## 2019-06-17 NOTE — NUR
AMBULATED IN HALLWAY WITH PT USING RW. REQUESTED AND GIVNE ONE OXY IR 5MG FOR
C/O LEFT HIP PAIN LEVEL 10. WILL MONITOR.

## 2019-06-17 NOTE — NUR
DEFLATED BULB AND REMOVED MORALES CATHER. PT TOLERATED WELL. PT C/O LEFT HIP
PAIN 10/10. GAVE OXY IR 5 MG PO. NO OTHER NEEDS.

## 2019-06-17 NOTE — NUR
SITTING UP IN BED EATING SUPPER. NO NAUSEA TONIGHT. REQUESTED AND GIVEN ONE
5MG OXY PO FOR C/O LEFT HIP PAIN LEVEL 10. WILL MONITOR. NO CHANGES NOTED.
DENIES NEEDS.

## 2019-06-18 NOTE — NUR
PT LYING IN BED RESTING WITHOUT DISTRESS. ALERT AND ORIENTED. ASSISTED UP TO
BATHROOM WITH WALKER AND BACK. REFUSES SCDS. IV RIGHT FA INFUSING 1/2 NS @ 50.
DENIES NEEDS AT THIS TIME. RAFAELA ON, CL IN REACH. WILL CTM

## 2019-06-18 NOTE — NUR
AWAKE AND ALERT. ORIENTED X3. NO C/O AT THIS TIME. LUNGS ARE CLEAR
BILATERALLY, NO COUGH NOTED. SKIN IS INTACT WITHOUT REDNESS EXCEPT INCISION TO
LEFT HIP WHICH IS CLEAN DRY AND WELL APPROXIMATED. IV TO RIGHT FOREARM IS
PATENT WITHOUT REDNESS AT INSERTION SITE. DENIES NEEDS.

## 2019-06-18 NOTE — NUR
ATE ABOUT HALF OF SUPPER. REQUESTED AND GIVNE ONE OXY 5MG AND ONE MG XANAX FOR
C/O PAIN AND AGITATION. WILL MONITOR.

## 2019-06-18 NOTE — NUR
REQUESTED AND GIVEN 5MG OXY PO FOR PAIN LEVEL 8 TO LEFT HIP AND REQUESTED AND
GIVEN ONE MG XANAX PO FOR C/O ANXIETY. WILL MONITOR.

## 2019-06-19 NOTE — NUR
AWAKE AND ALERT. ORIENTED X3. NO C/O AT THIS TIME. LUNGS HAVE FAINT CRACKLES
THROUGHOUT LUNG FIELDS. NON PRODUCTIVE COUGH NOTED. SKIN IS INTACT WITHOUT
REDNESS EXCEPT INCISION TO LEFT HIP WHICH IS CLEAN DRY AND WELL APPROXIMATED.
IV TO RIGHT FOREARM IS PATENT WTIHOUT REDNESS AT INSERTION SITE. DENIES NEEDS.

## 2019-06-19 NOTE — NUR
REQUESTED AND GIVEN ONE OXY 5MG PO AND ONE MG XANAX PO FOR C/O PAIN LEVEL 10
AND ANXIETY. WILL MONITOR.

## 2019-06-20 NOTE — NUR
MORNING ASSESSMENT COMPLETE. SEE ASSESSMENT FLOWSHEET FOR FURTHER DETIALS. PT
LYING IN BED AAO X4 TO PERSON, PLACE, TIME, AND SITUATION. DENIES NEEDS AT
THIS TIME. CL IN REACH. SIDE RAILS UP X3 FOR PT SAEFTY. BED IN LOWEST
POSITION. APPLIED MEPILEX AG TO L HIP INCISION. INCISION HEALING- NO REDNESS,
SWELLING, OR SIGNS OF INFECTION NOTED.

## 2019-06-20 NOTE — NUR
PT ALERT AND ORIENTED. LEFT HIP INCISION DRESSING COVERED WITH MEPILEX AG.
STATES SHE IS COMFORTABLE AT THIS TIME. HAS LEFT UPPER ARM IV THAT IS CLEAN
AND DRY WITHOUT REDNESS OR TENDERNESS TO THE INSERTION SITE. PATIENT HAS CALL
LIGHT. FALL PRECAUTIONS IN PLACE.

## 2019-06-21 NOTE — NUR
LYING IN BED,WITHOUT DISTRESS.STATES HAVING PAIN IN SHOULDER. WANTS PAIN MEDS
WHEN TIME.MONITOR FOR NEEDS.CALL LIGHT IN REACH

## 2019-06-21 NOTE — NUR
ASSESSMENT COMPLETE. ALERT AND ORIENTED X 3. BREATH SOUNDS CLEAR IN ALL LOBES,
RESPIRATIONS NONLABORED.SCDS REFUSED. NO SIGNS OF DISTRESS. BED LOW, CALL
LIGHT IN REACH, RAILS UP X 2.

## 2019-06-21 NOTE — NUR
PT ALERT AND ORIENTED. LEFT HIP INCISION WITH MEPILEX TO SIDE. DENIES OTHER
ISSUES AT THIS TIME. AMBULATES WITH ASSISTANCE TO BATHROOM WITH WALKER. HAS
LEFT UPPER ARM IV THAT IS INFUSING PER ORDER. HAS CALL LIGHT IN REACH. CALLS
WHEN HAS NEEDS.

## 2019-06-21 NOTE — NUR
RESTING IN BED WITH LIGHTS OFF. EASILY AWAKENED, ALERT AND ORIENTED X 3.
DENIES PAIN, NO SIGNS OF DISTRESS.

## 2019-06-22 NOTE — MORECARE
CASE MANAGEMENT DISCHARGE SUMMARY
 
 
PATIENT: TELLY PRATER                     UNIT: [id number]
ACCOUNT#: [id number]                       ADM DATE: 19
AGE: 59     : 04/15/60  SEX: F            ROOM/BED: D.2211    
AUTHOR: KEVINDOC                             PHYSICIAN:                               
 
REFERRING PHYSICIAN: BJ FOLEY MD               
DATE OF SERVICE: 19
Discharge Plan
 
 
Patient Name: TELLY PRATER
Facility: [facility]:[place]
Encounter #: [id number]
Medical Record #: [id number]
: 1960
Planned Disposition: Home or Self Care
Anticipated Discharge Date: 19
 
Discharge Date: 
Expected LOS: 14
Initial Reviewer: [id number]
Initial Review Date: 2019
Generated: 19   4:34 pm 
DCP- Discharge Planning
 
Updated by [email or web address]: Gladys Werner on 19  12:22 pm CT
Patient Name: TELLY PRATER                                     
Admission Status: ER   
Accout number: [id number]                              
Admission Date: 2019   
: 1960                                                        
Admission Diagnosis:FRACTURE OF UNSP PART OF NECK OF LEFT FEMUR, INIT   
Attending: BJ FOLEY                                                
Current LOS:  3   
  
Anticipated DC Date:    
Planned Disposition: Home or Self Care   
Primary Insurance: MEDICAID [place]   
  
  
Discharge Planning Comments:   
  
CM met with patient to complete initial dc planning assessment.  CM educated 
patient on the CM role and verbal consent given by patient to complete 
assessment.   Patient lives at home with sister where she states she is 
independent with her care.  At discharge patient plans to return home  and 
feels this is a safe discharge. Tanja (sister) will be her  home when 
 
she is discharged.  CM discussed availability of home health, rehab services, 
and medical equipment.  She states that she has a nebulizer at home that she 
uses, she also states that she has home health, but I called [facility], [facility], 
[facility], [facility] home health and none of them have her on her service. Patient 
denied known discharge needs at this time. CM will continue to follow and 
will assist as needed with dc plans/needs.    
  
  
  
  
: Gladys Werner
 DCPIA - Discharge Planning Initial Assessment
 
Updated by [email or web address]: Gladys Werner on 19   1:19 pm
*  Is the patient Alert and Oriented?
Yes
*  How many steps to enter\exit or inside your home? ramp *  PCP NENA *  Pharmacy [facility]
[address]
*  Preadmission Environment
Home with Family
*  ADLs
Independent
*  Equipment
Nebulizer
*  List name and contact numbers for known caregivers / representatives who 
currently or will assist patient after discharge:
TANJA MCCAULEY (SISTER) [phone]
*  Verbal permission to speak to the caregivers and representatives has been 
obtained from the patient.
N/A
*  Community resources currently utilized
Other
*  Please name any agencies selected above.
STATES SHE HAS HOME HEALTH  I [facility]ED   [facility]  [facility]  [facility]  SHE 
IS NOT ON SERVICE WITH ANY OF THESE
 
 
 
 
 
*  Additional services required to return to the preadmission environment?
No
*  Can the patient safely return to the preadmission environment?
Yes
*  Has this patient been hospitalized within the prior 30 days at any 
hospital?
No
 
 
 
 
 
 
 
 
Last DP export: 19  12:23 p
Patient Name: TELLY PRATER
Encounter #: [id number]
Page [id number]
 
 
 
 
 
Electronically Signed by GRAHAM BROWN on 19 at 1534
 
 
 
 
 
 
**All edits/amendments must be made on the electronic document**
 
DICTATION DATE: 19     : KIRA  19 153     
RPT#: [id number]                                DC DATE:        
                                               STATUS: ADM IN  
[facility]
[address] [place] [address]
***END OF REPORT***

## 2019-06-22 NOTE — NUR
PT DISCHARGED TO HOME WITH SISTER PT DISCHARGE SUMMARY AND MEDS REVIEWED WITH
PT NO QUESTIONS OR CONCERNS

## 2019-06-22 NOTE — MORECARE
CASE MANAGEMENT DISCHARGE SUMMARY
 
 
PATIENT: TELLY PRATER                     UNIT: [id number]
ACCOUNT#: [id number]                       ADM DATE: 19
AGE: 59     : 04/15/60  SEX: F            ROOM/BED: D.2211    
AUTHOR: KEVIN,DOC                             PHYSICIAN:                               
 
REFERRING PHYSICIAN: BJ FOLEY MD               
DATE OF SERVICE: 19
Discharge Plan
 
 
Patient Name: TELLY PRATER
Facility: [facility]:[place]
Encounter #: [id number]
Medical Record #: [id number]
: 1960
Planned Disposition: Home or Self Care
Anticipated Discharge Date: 19
 
Discharge Date: 2019
Expected LOS: 14
Initial Reviewer: [email or web address]
Initial Review Date: 2019
Generated: 19   4:56 pm 
Comments
 
DCP- Discharge Planning
 
Updated by [email or web address]: Aleksandra Leonard on 19   2:52 pm CT
LATE ENTRY 1325  
CM MET WITH THE PATIENT AT THE BEDSIDE. SHE WAS VERY ANXIOUS FOR DISCHARGE TO 
HOME.  
SHE HAS A WALKER AT HOME IN ADDITION TO HER NEBULIZER.  
CM ASK WHAT HER PLAN WAS FOR THERAPY. SHE STATED THE DOCTOR SAID OUTPATIENT 
OR HOME HEALTH. SHE HAS HAD HOME HEALTH PREVIOUSLY. CM DISCUSSED HOME HEALTH 
AND PROVIDED HER WITH A LIST OF PROVIDERS IN ADDITION TO DETAILING ALL 
PROVIDERS. SHE REQUESTED [facility].  
PATIENT CHOICE FORM OBTAINED .  
TC TO [facility] -[phone]. SPOKE WITH EDGARD THEN WAS TRANSFERED 
TO THE SUPERVISOR YOLY. REVIEWED PATIENT NEEDS AND CLINICAL REVIEW.   
THE PATIENT WILL LIKELY BE SEEN ON MONDAY.  
CM FAXED DISCHARGE SUMMARY, D/C MED LIST, OPERATIVE NOTE, ID CONSULT, ORTHO 
CONSULT AND DISCHARGE ORDERS W/ HOME HEALTH ORDERS.  
PCP- DR BARRETT  
[facility]  
PATIENT HAD [facility] HEALTH PREVIOUSLY. HOME HEALTH FOR SKILLED NURSING AND 
PHYSICAL THERAPY.
DCP- Discharge Planning
 
 
Updated by [email or web address]: Gladys Werner on 19  12:22 pm CT
Patient Name: TELLY PRATER                                     
Admission Status: ER   
Accout number: [id number]                              
Admission Date: 2019   
: 1960                                                        
Admission Diagnosis:FRACTURE OF UNSP PART OF NECK OF LEFT FEMUR, INIT   
Attending: BJ FOLEY                                                
Current LOS:  3   
  
Anticipated DC Date:    
Planned Disposition: Home or Self Care   
Primary Insurance: MEDICAID [place]   
  
  
Discharge Planning Comments:   
  
CM met with patient to complete initial dc planning assessment.  CM educated 
patient on the CM role and verbal consent given by patient to complete 
assessment.   Patient lives at home with sister where she states she is 
independent with her care.  At discharge patient plans to return home  and 
feels this is a safe discharge. Tanja (sister) will be her  home when 
she is discharged.  CM discussed availability of home health, rehab services, 
and medical equipment.  She states that she has a nebulizer at home that she 
uses, she also states that she has home health, but [facility], [facility], [facility] and none of them have her on her service. Patient 
denied known discharge needs at this time. CM will continue to follow and 
will assist as needed with dc plans/needs.    
  
  
  
  
: Gladys Werner
 DCPIA - Discharge Planning Initial Assessment
 
Updated by [email or web address]: Gladys Werner on 19   1:19 pm
*  Is the patient Alert and Oriented?
Yes
*  How many steps to enter\exit or inside your home? ramp *  PCP BARRETT *  Pharmacy [facility]
[address]
*  Preadmission Environment
Home with Family
*  ADLs
Independent
*  Equipment
Nebulizer
*  List name and contact numbers for known caregivers / representatives who 
currently or will assist patient after discharge:
TANJA MCCAULEY (SISTER) [phone]
*  Verbal permission to speak to the caregivers and representatives has been 
obtained from the patient.
 
N/A
*  Community resources currently utilized
Other
*  Please name any agencies selected above.
STATES SHE HAS HOME HEALTH  I CALLED   [facility]  [facility]  [facility]  SHE 
IS NOT ON SERVICE WITH ANY OF THESE
 
 
 
 
 
*  Additional services required to return to the preadmission environment?
No
*  Can the patient safely return to the preadmission environment?
Yes
*  Has this patient been hospitalized within the prior 30 days at any 
hospital?
No
 
 
 
 
 
Coverage Notice
 
Reviewer: [email or web address] - Aleksandra Leonard
 
Notice Issued Date-Time: 2019  13:25
Notice Type: Patient Choice Letter
 
Notice Delivered To: Patient
Relationship to Patient: Self
Representative Name: 
 
Delivery Method: HAND - Hand Delivered
Agueda Days:
Prior Verbal Notification: 
 
Recipient Understood Notice: Yes
Recipient Signature: Yes
Med Rec Note Co-signed by Attending:
 
Coverage Notice Comment:  PATIENT CHOICE FOR HOME HEALTH PROVID[facility] OF [facility].
 
Last DP export: 19   2:34 p
Patient Name: TELLY PRATER
 
Encounter #: [id number]
Page 86361
 
 
 
 
 
Electronically Signed by GRAHAM BROWN on 19 at 1556
 
 
 
 
 
 
**All edits/amendments must be made on the electronic document**
 
DICTATION DATE: 19     : KIRA  19     
RPT#: [id number]                                DC DATE:19
                                               STATUS: DIS IN  
[facility]
 [place] [address]
***END OF REPORT***

## 2019-06-24 NOTE — MORECARE
CASE MANAGEMENT DISCHARGE SUMMARY
 
 
PATIENT: TELLY PRATER                     UNIT: [id number]
ACCOUNT#: [id number]                       ADM DATE: 19
AGE: 59     : 04/15/60  SEX: F            ROOM/BED: D.2211    
AUTHOR: KEVIN,DOC                             PHYSICIAN:                               
 
REFERRING PHYSICIAN: BJ FOLEY MD               
DATE OF SERVICE: 19
Discharge Plan
 
 
Patient Name: TELLY PRATER
Facility: [facility]:[place]
Encounter #: [id number]
Medical Record #: [id number]
: 1960
Planned Disposition: Home or Self Care
Anticipated Discharge Date: 19
 
Discharge Date: 2019
Expected LOS: 14
Initial Reviewer: [email or web address]
Initial Review Date: 2019
Generated: 19   9:42 am 
Comments
 
DCP- Discharge Planning
 
Updated by [email or web address]: Aleksandra Mineral on 19   2:52 pm CT
LATE ENTRY 1325  
CM MET WITH THE PATIENT AT THE BEDSIDE. SHE WAS VERY ANXIOUS FOR DISCHARGE TO 
HOME.  
SHE HAS A WALKER AT HOME IN ADDITION TO HER NEBULIZER.  
CM ASK WHAT HER PLAN WAS FOR THERAPY. SHE STATED THE DOCTOR SAID OUTPATIENT 
OR HOME HEALTH. SHE HAS HAD HOME HEALTH PREVIOUSLY. CM DISCUSSED HOME HEALTH 
AND PROVIDED HER WITH A LIST OF PROVIDERS IN ADDITION TO DETAILING ALL 
PROVIDERS. SHE REQUESTED [facility].  
PATIENT CHOICE FORM OBTAINED .  
TC TO [facility] -[phone]. SPOKE WITH EDGARD THEN WAS TRANSFERED 
TO THE SUPERVISOR YOLY. REVIEWED PATIENT NEEDS AND CLINICAL REVIEW.   
THE PATIENT WILL LIKELY BE SEEN ON MONDAY.  
CM FAXED DISCHARGE SUMMARY, D/C MED LIST, OPERATIVE NOTE, ID CONSULT, ORTHO 
CONSULT AND DISCHARGE ORDERS W/ HOME HEALTH ORDERS.  
PCP- DR BARRETT  
[facility]  
PATIENT HAD [facility] HEALTH PREVIOUSLY. HOME HEALTH FOR SKILLED NURSING AND 
PHYSICAL THERAPY.
DCP- Discharge Planning
 
 
Updated by [id number]: Gladys Werner on 19  12:22 pm CT
Patient Name: TELLY PRATER                                     
Admission Status: ER   
Accout number: [id number]                              
Admission Date: 2019   
: 1960                                                        
Admission Diagnosis:FRACTURE OF UNSP PART OF NECK OF LEFT FEMUR, INIT   
Attending: BJ FOLEY                                                
Current LOS:  3   
  
Anticipated DC Date:    
Planned Disposition: Home or Self Care   
Primary Insurance: MED[place]ID [place]   
  
  
Discharge Planning Comments:   
  
CM met with patient to complete initial dc planning assessment.  CM educated 
patient on the CM role and verbal consent given by patient to complete 
assessment.   Patient lives at home with sister where she states she is 
independent with her care.  At discharge patient plans to return home  and 
feels this is a safe discharge. Tanja (sister) will be her  home when 
she is discharged.  CM discussed availability of home health, rehab services, 
and medical equipment.  She states that she has a nebulizer at home that she 
uses, she also states that she has home health, but I called [facility], [facility], 
[facility], [facility] and none of them have her on her service. Patient 
denied known discharge needs at this time. CM will continue to follow and 
will assist as needed with dc plans/needs.    
  
  
  
  
: Gladys Werner
 DCPIA - Discharge Planning Initial Assessment
 
Updated by [email or web address]: Gladys Werner on 19   1:19 pm
*  Is the patient Alert and Oriented?
Yes
*  How many steps to enter\exit or inside your home? ramp *  PCP BARRETT *  [facility]arm[facility]
[address]
*  Preadmission Environment
Home with Family
*  ADLs
Independent
*  Equipment
Nebulizer
*  List name and contact numbers for known caregivers / representatives who 
currently or will assist patient after discharge:
TANJA MCCAULEY (SISTER) [phone]
*  Verbal permission to speak to the caregivers and representatives has been 
obtained from the patient.
 
N/A
*  Community resources currently utilized
Other
*  Please name any agencies selected above.
STATES SHE HAS HOME HEALTH  I CALLED   [facility]  [facility]  [facility]  SHE 
IS NOT ON SERVICE WITH ANY OF THESE
 
 
 
 
 
*  Additional services required to return to the preadmission environment?
No
*  Can the patient safely return to the preadmission environment?
Yes
*  Has this patient been hospitalized within the prior 30 days at any 
hospital?
No
 
 
 
 
 
Coverage Notice
 
Reviewer: [email or web address] - Aleksandra Leonard
 
Notice Issued Date-Time: 2019  13:25
Notice Type: Patient Choice Letter
 
Notice Delivered To: Patient
Relationship to Patient: Self
Representative Name: 
 
Delivery Method: HAND - Hand Delivered
Agueda Days:
Prior Verbal Notification: 
 
Recipient Understood Notice: Yes
Recipient Signature: Yes
Med Rec Note Co-signed by Attending:
 
Coverage Notice Comment:  PATIENT CHOICE FOR HOME HEALTH PROVIDER OF [facility].
 
Last DP export: 19   2:56 p
Patient Name: TELLY PRATER
 
Encounter #: [id number]
Page [phone]
 
 
 
 
 
Electronically Signed by GRAHAM BROWN on 19 at 0843
 
 
 
 
 
 
**All edits/amendments must be made on the electronic document**
 
DICTATION DATE: 19     : KIRA  1942     
RPT#: [id number]                                DC DATE:19
                                               STATUS: DIS IN  
[facility]
 [place] [address]
***END OF REPORT***

## 2019-08-08 NOTE — NUR
IN ROOM TO DRAW LABS, PT STANDING AT END OF BED, DIRECTED TO SIT IN
CHAIR SO THAT  MAY DRAW THE SAMPLE. PT SITTING IN CHAIR AT THIS TIME
WITH CALL LIGHT IN REACH AND SLIPPER SOCKS ON.

## 2019-08-08 NOTE — NUR
TAI FROM CT CALLED TO INFORM PT NOT COOPERATING DURING CT. PT SAT UP 2 TIMES
DURING FIRST ATTEMPT OF CTA. 
CALLED [facility] AND WAS GIVEN ONE TIME PHONE ORDER FOR 50 MG BENEDRYL IV.
REPEATED TELEPHONE ORDER AND CONFIRMED AT THIS TIME

## 2019-08-08 NOTE — NUR
RESUMING PATIENT CARE. PATIENT IS RESTING COMFORTABLY IN BED. PATIENT APPEARS
TO BE SLEEPING. RESPIRATIONS ARE EVEN AND UNLABORED. NO S/S OF DISTRESS. NO
C/O PAIN. CALLL IGHT WITHIN REACH. WILL CPOC.

## 2019-08-08 NOTE — NUR
PATIENT RESTING COMFORTABLY IN BED. RESPIRATIONS ARE EVEN AND UNLABORED. NO
S/S OF DISTRESS. NO C/O PAIN. CALL LIGHT WITHIN REACH. WILL CPOC.

## 2019-08-08 NOTE — NUR
FOUND DOWNSTAIRS, HEADING OUTSIDE, ASTATES SHES GOING TO GO BUY CIGARETTES.
ESCORTED BACK TO FLOOR BY BECKY.  EXPALINED TO HER SHE COULD NOT LEAVE HOSP.
WHEN OFFERED A NICOTINE PATCH, SHE REFUSES.  WILL CONT. TO MONITOR.

## 2019-08-08 NOTE — NUR
STATES SHE IS LEAVING TO GO DOWN STAIRS TO FIND HER A CIGARETTE. ENCOURAGED
HER NOT TO GO BUT SHE WOULD NOT LISTEN.  BROUGHT BACK TO FLOOR BY [facility]3 NURSE
AFTER ASKING HER IF SHE SMOKED, NURSE TOLD HER NO, AND SHOWED HER BACK TO HER
ROOM.

## 2019-08-08 NOTE — NUR
PT FREQUENTLY CHANGING POSITIONS, REMINDER TO KEEP HAND STRAIGHT FOR IV FLUIDS
TO FLOW IN, BED RAILS UP X 2 FOR POSITIONING ASSIST, NONSLIP SOCKS APPLIED AT
ARRIVAL TO ED, PT FREQUENTLY REMINDED OF NEED TO USE CALL LIGHT FOR ASSISTANCE
TO AMBULATE.

## 2019-08-08 NOTE — NUR
PATIENT ARRIVED FROM ER. PATIENT ARGUMENTATIVE. PATIENT CONTINUES TO REPEAT
THAT SHE IS NOT STAYING HERE, THAT HER DAUGHTER IS COMING TO GET HER. PATIENT
REFUSES TO ANSWER QUESTIONS IN ORDER TO COMPLETE HER MED REC AND HER ADMISSION
HISTORY. PATIENT REFUSES THE INFUSES OF LACTATED RINGERS.

## 2019-08-09 NOTE — NUR
INITIAL ROUNDS AND ASSESSMENT COMPLETED. PT AMBULATORY ALL OVER THE UNIT AND
THEN HAS TO BE REDIRECTED BACK TO HER ROOM. VERY FORGETFUL. SR PER TELEMETRY.
RIGHT WRIST PIV SALINE LOCKED. CPOC.

## 2019-08-10 NOTE — NUR
PT HAS RESTED THROUGH THE NIGHT. UP X 1 TO WANDER IN THE HALLWAY AND HAD TO
DIRECTED BACK TO ROOM. WAS GIVEN REQUESTED PAIN PILL AND ALL OTHER BEDTIME
MEDS AT BEDTIME.

## 2019-08-10 NOTE — MORECARE
CASE MANAGEMENT DISCHARGE SUMMARY
 
 
PATIENT: TELLY PRATER BRYANT                     UNIT: [id number]
ACCOUNT#: [id number]                       ADM DATE: 19
AGE: 59     : 04/15/60  SEX: F            ROOM/BED: D.7337    
AUTHOR: GRAHAM BROWN                             PHYSICIAN:                               
 
REFERRING PHYSICIAN: SONDRA GOLDBERG MD               
DATE OF SERVICE: 08/10/19
Discharge Plan
 
 
Patient Name: TELLY PRATER
Facility: [facility]:[place]
Encounter #: [id number]
Medical Record #: [id number]
: 1960
Planned Disposition: Home with Home Health
Anticipated Discharge Date: 8/10/19
 
Discharge Date: 08/10/2019
Expected LOS: 2
Initial Reviewer: [email or web address]
Initial Review Date: 2019
Generated: 8/10/19   6:11 pm 
  
 
 
 
 
 
 
 
Patient Name: TELLY PRATER
 
Encounter #: [id number]
Page [id number]
 
 
 
 
 
Electronically Signed by GRAHAM BROWN on 08/10/19 at 1711
 
 
 
 
 
 
**All edits/amendments must be made on the electronic document**
 
DICTATION DATE: 08/10/19 1711     : KIRA  08/10/19 1711     
RPT#: [id number]                                DC DATE:08/10/19
                                               STATUS: DIS IN  
[facility]
[address] [place] [address]
***END OF REPORT***

## 2019-08-10 NOTE — MORECARE
CASE MANAGEMENT DISCHARGE SUMMARY
 
 
PATIENT: TELLY PRATER                     UNIT: [id number]
ACCOUNT#: [id number]                       ADM DATE: 19
AGE: 59     : 04/15/60  SEX: F            ROOM/BED: D.3255    
AUTHOR: KEVIN,DOC                             PHYSICIAN:                               
 
REFERRING PHYSICIAN: SONDRA GOLDBERG MD               
DATE OF SERVICE: 08/10/19
Discharge Plan
 
 
Patient Name: TELLY PRATER
Facility: [facility]:[place]
Encounter #: [id number]
Medical Record #: [id number]
: 1960
Planned Disposition: Home with Home Health
Anticipated Discharge Date: 8/10/19
 
Discharge Date: 08/10/2019
Expected LOS: 2
Initial Reviewer: [email or web address]
Initial Review Date: 2019
Generated: 8/10/19   6:25 pm 
Comments
 
DCP- Discharge Planning
 
Updated by [id number]: Aleksandra Leonard on 8/10/19   4:24 pm CT
LATE ENTRY  1345  
CM MET WITH THE PATIENT AT THE BEDSIDE.  
SHE IS ANXIOUS FOR DISCHARGE. GABE, HER PRIMARY NURSE, AND HER DAUGHTER WERE 
ALSO PRESENT. CM EXPLAINED MY ROLE. THE PATIENT GAVE PERMISSION TO PROCEED 
WITH ASSESSMENT. SHE STATES SHE STAYS WITH HER DAUGHTER AT [address] [place] [address] 
IN [place]. GAVE PERMISSION FOR DAUGHTER TO ASSIST W/ DISCHARGE PLANNING. 
SHE STATES SHE IS ON SERVICES W/ [facility].  
SHE WISHES TO CONTINUE WITH THEIR CARE.  
PATIENT CHOICE FORM OBTAINED. SIGNED COPY TO THE PATIENT AND SIGNED COPY TO 
HER HARD COVER CHART.  
PHONE NUMBER- [phone]  
PCP- DR BARRETT  
PHARMACY IS [facility]AR THE [facility] AT THIS TIME.  
SHE HAS TRANSPORTATION TO HOME.  
SHE DENIES ANY OTHER NEEDS.   
DME- NEBULIZER.  
1700- TC TO [facility]. LEFT VM WITH MY CONTACT PHONE NUMBER 
FOR CALL BACK.  
FAXED REFERRAL
 
 DCPIA - Discharge Planning Initial Assessment
 
Updated by [email or web address]: Aleksandra Leonard on 8/10/19   5:15 pm
*  Is the patient Alert and Oriented?
Yes
*  How many steps to enter\exit or inside your home? THREE *  PCP DR VALENTIN BARRETT * 
Pharmacy
[facility]
*  Preadmission Environment
Home with Family
*  ADLs
Partial Dependent
*  Partial ADLs (Assistance needed)
Dressing
Medication Management
*  Equipment
Nebulizer
*  Verbal permission to speak to the caregivers and representatives has been 
obtained from the patient.
Yes
*  [facility] resources currently utilized
Home Health
*  Please name any agencies selected above.
[facility] [phone]
*  Additional services required to return to the preadmission environment?
Yes
*  Can the patient safely return to the preadmission environment?
Yes
*  Has this patient been hospitalized within the prior 30 days at any 
hospital?
No
 
 
 
 
 
 
 
Last DP export: 8/10/19   4:18 p
Patient Name: TELLY PRATER
 
Encounter #: [id number]
Page [id number]
 
 
 
 
 
Electronically Signed by GRAHAM BROWN on 08/10/19 at 1725
 
 
 
 
 
 
**All edits/amendments must be made on the electronic document**
 
DICTATION DATE: 08/10/19 1725     : KIRA  08/10/19 1725     
RPT#: [id number]                                DC DATE:08/10/19
                                               STATUS: DIS IN  
[facility]
 [place] [address]
***END OF REPORT***

## 2019-08-11 NOTE — MORECARE
CASE MANAGEMENT DISCHARGE SUMMARY
 
 
PATIENT: TELLY PRATER                     UNIT: [id number]
ACCOUNT#: [id number]                       ADM DATE: 19
AGE: 59     : 04/15/60  SEX: F            ROOM/BED: D.3745    
AUTHOR: KEVIN,DOC                             PHYSICIAN:                               
 
REFERRING PHYSICIAN: SONDRA GOLDBERG MD               
DATE OF SERVICE: 19
Discharge Plan
 
 
Patient Name: TELLY PRATER
Facility: [facility]:[place]
Encounter #: [id number]
Medical Record #: [id number]
: 1960
Planned Disposition: Home with Home Health
Anticipated Discharge Date: 8/10/19
 
Discharge Date: 08/10/2019
Expected LOS: 2
Initial Reviewer: [email or web address]
Initial Review Date: 2019
Generated: 19   7:41 pm 
Comments
 
DCP- Discharge Planning
 
Updated by [id number]: Aleksandra Leonard on 8/10/19   4:24 pm CT
LATE ENTRY  1345  
CM MET WITH THE PATIENT AT THE BEDSIDE.  
SHE IS ANXIOUS FOR DISCHARGE. GABE, HER PRIMARY NURSE, AND HER DAUGHTER WERE 
ALSO PRESENT. CM EXPLAINED MY ROLE. THE PATIENT GAVE PERMISSION TO PROCEED 
WITH ASSESSMENT. SHE STATES SHE STAYS WITH HER DAUGHTER AT [address] [place] [address] 
IN [place]. GAVE PERMISSION FOR DAUGHTER TO ASSIST W/ DISCHARGE PLANNING. 
SHE STATES SHE IS ON SERVICES W/ [facility].  
SHE WISHES TO CONTINUE WITH THEIR CARE.  
PATIENT CHOICE FORM OBTAINED. SIGNED COPY TO THE PATIENT AND SIGNED COPY TO 
HER HARD COVER CHART.  
PHONE NUMBER- [phone]  
PCP- DR BARRETT  
PHARMACY IS [facility]AR THE [facility] AT THIS TIME.  
SHE HAS TRANSPORTATION TO HOME.  
SHE DENIES ANY OTHER NEEDS.   
DME- NEBULIZER.  
1700- TC TO [facility]. LEFT VM WITH MY CONTACT PHONE NUMBER 
FOR CALL BACK.  
FAXED REFERRAL
 
 DCPIA - Discharge Planning Initial Assessment
 
Updated by [email or web address]: Aleksandra Leonard on 8/10/19   5:15 pm
*  Is the patient Alert and Oriented?
Yes
*  How many steps to enter\exit or inside your home? THREE *  PCP DR VALENTIN BARRETT * 
Pharmacy
[facility]
*  Preadmission Environment
Home with Family
*  ADLs
Partial Dependent
*  Partial ADLs (Assistance needed)
Dressing
Medication Management
*  Equipment
Nebulizer
*  Verbal permission to speak to the caregivers and representatives has been 
obtained from the patient.
Yes
*  [facility] resources currently utilized
Home Health
*  Please name any agencies selected above.
[facility] [phone]
*  Additional services required to return to the preadmission environment?
Yes
*  Can the patient safely return to the preadmission environment?
Yes
*  Has this patient been hospitalized within the prior 30 days at any 
hospital?
No
 
 
 
 
 
Coverage Notice
 
Reviewer: [email or web address] - Aleksandra Blaine
 
Notice Issued Date-Time: 08/10/2019  13:50
Notice Type: IM Discharge Notice
 
Notice Delivered To: Patient
Relationship to Patient: Self
Representative Name: 
 
Delivery Method: HAND - Hand Delivered
Agueda Days:
Prior Verbal Notification: 
 
Recipient Understood Notice: Yes
 
Recipient Signature: Yes
Med Rec Note Co-signed by Attending:
 
Coverage Notice Comment:  CM DISCUSSED DISCHARGE IMM. PATIENT HAD NO QUESTIONS
OR CONCERNS.  SIGNATURE TO 2 ORIGINALS. ONE SIGNED COPY TO THE PATIENT. ONE 
SIGNED COPY TO THE HARD COVER CHART.
 
 
Last DP export: 8/10/19   4:25 p
Patient Name: TELLY PRATER
Encounter #: [id number]
Page [id number]
 
 
 
 
 
Electronically Signed by GRAHAM BROWN on 19 at 1842
 
 
 
 
 
 
**All edits/amendments must be made on the electronic document**
 
DICTATION DATE: 19     : KIRA  19     
RPT#: [id number]                                DC DATE:08/10/19
                                               STATUS: DIS IN  
[facility]
[address] [place] [address]
***END OF REPORT***

## 2019-08-11 NOTE — MORECARE
CASE MANAGEMENT DISCHARGE SUMMARY
 
 
PATIENT: TELLY PRATER                     UNIT: [id number]
ACCOUNT#: [id number]                       ADM DATE: 19
AGE: 59     : 04/15/60  SEX: F            ROOM/BED: D.4738    
AUTHOR: KEVIN,DOC                             PHYSICIAN:                               
 
REFERRING PHYSICIAN: SONDRA GOLDBERG MD               
DATE OF SERVICE: 19
Discharge Plan
 
 
Patient Name: TELLY PRATER
Facility: [facility]:[place]
Encounter #: [id number]
Medical Record #: [id number]
: 1960
Planned Disposition: Home with Home Health
Anticipated Discharge Date: 8/10/19
 
Discharge Date: 08/10/2019
Expected LOS: 2
Initial Reviewer: [email or web address]
Initial Review Date: 2019
Generated: 19   7:48 pm 
Comments
 
DCP- Discharge Planning
 
Updated by [id number]: Aleksandra Leonard on 19   5:41 pm CT
LATE ENTRY  
0930 CM CONFIRMED REFERRAL WAS RECEIVED AND THAT THE PATIENT WAS ON ACTIVE 
SERVICE.
DCP- Discharge Planning
 
Updated by [id number]: Aleksandra Leonard on 8/10/19   4:24 pm CT
LATE ENTRY  1345  
CM MET WITH THE PATIENT AT THE BEDSIDE.  
SHE IS ANXIOUS FOR DISCHARGE. GABE, HER PRIMARY NURSE, AND HER DAUGHTER WERE 
ALSO PRESENT. CM EXPLAINED MY ROLE. THE PATIENT GAVE PERMISSION TO PROCEED 
WITH ASSESSMENT. SHE STATES SHE STAYS WITH HER DAUGHTER AT [address] [place] [address] 
IN [place]. GAVE PERMISSION FOR DAUGHTER TO ASSIST W/ DISCHARGE PLANNING. 
SHE STATES SHE IS ON SERVICES W/ [facility].  
SHE WISHES TO CONTINUE WITH THEIR CARE.  
PATIENT CHOICE FORM OBTAINED. SIGNED COPY TO THE PATIENT AND SIGNED COPY TO 
HER HARD COVER CHART.  
PHONE NUMBER- [phone]  
PCP- DR BARRETT  
PHARMACY IS [facility] AT THIS TIME.  
 
SHE HAS TRANSPORTATION TO HOME.  
SHE DENIES ANY OTHER NEEDS.   
DME- NEBULIZER.  
1700- TC TO [facility]. LEFT VM WITH MY CONTACT PHONE NUMBER 
FOR CALL BACK.  
FAXED REFERRAL
 DCPIA - Discharge Planning Initial Assessment
 
Updated by [id number]: Aleksandra Leonard on 8/10/19   5:15 pm
*  Is the patient Alert and Oriented?
Yes
*  How many steps to enter\exit or inside your home? THREE *  PCP DR VALENTIN BARRETT * 
Pharmacy
[facility]
*  Preadmission Environment
Home with Family
*  ADLs
Partial Dependent
*  Partial ADLs (Assistance needed)
Dressing
Medication Management
*  Equipment
Nebulizer
*  Verbal permission to speak to the caregivers and representatives has been 
obtained from the patient.
Yes
*  Community resources currently utilized
Home Health
*  Please name any agencies selected above.
[facility]- [phone]
*  Additional services required to return to the preadmission environment?
Yes
*  Can the patient safely return to the preadmission environment?
Yes
*  Has this patient been hospitalized within the prior 30 days at any 
hospital?
No
 
 
 
 
 
Coverage Notice
 
Reviewer: [email or web address] Paul Leonard
 
Notice Issued Date-Time: 08/10/2019  13:50
Notice Type: IM Discharge Notice
 
Notice Delivered To: Patient
Relationship to Patient: Self
Representative Name: 
 
 
Delivery Method: HAND - Hand Delivered
Agueda Days:
Prior Verbal Notification: 
 
Recipient Understood Notice: Yes
Recipient Signature: Yes
Med Rec Note Co-signed by Attending:
 
Coverage Notice Comment:  CM DISCUSSED DISCHARGE IMM. PATIENT HAD NO QUESTIONS
OR CONCERNS.  SIGNATURE TO 2 ORIGINALS. ONE SIGNED COPY TO THE PATIENT. ONE 
SIGNED COPY TO THE HARD COVER CHART.
 
 
Last DP export: 19   5:42 p
Patient Name: TELLY PRATER
Encounter #: [id number]
Page [id number]
 
 
 
 
 
Electronically Signed by GRAHAM BROWN on 19 at 1848
 
 
 
 
 
 
**All edits/amendments must be made on the electronic document**
 
DICTATION DATE: 19     : KIRA  19     
RPT#: [id number]                                DC DATE:08/10/19
                                               STATUS: DIS IN  
[id number] [place] [address]
***END OF REPORT***

## 2019-08-12 NOTE — MORECARE
CASE MANAGEMENT DISCHARGE SUMMARY
 
 
PATIENT: TELLY PRATER                     UNIT: [id number]
ACCOUNT#: [id number]                       ADM DATE: 19
AGE: 59     : 04/15/60  SEX: F            ROOM/BED: D.8226    
AUTHOR: KEVIN,DOC                             PHYSICIAN:                               
 
REFERRING PHYSICIAN: SONDRA GOLDBERG MD               
DATE OF SERVICE: 19
Discharge Plan
 
 
Patient Name: TELLY PRATER
Facility: [facility]:[place]
Encounter #: [id number]
Medical Record #: [id number]
: 1960
Planned Disposition: Home with Home Health
Anticipated Discharge Date: 8/10/19
 
Discharge Date: 08/10/2019
Expected LOS: 2
Initial Reviewer: [email or web address]
Initial Review Date: 2019
Generated: 19  10:22 am 
Comments
 
DCP- Discharge Planning
 
Updated by [id number]: Aleksandra Leonard on 19   5:41 pm CT
LATE ENTRY  
0930 CM CONFIRMED REFERRAL WAS RECEIVED AND THAT THE PATIENT WAS ON ACTIVE 
SERVICE.
DCP- Discharge Planning
 
Updated by [id number]: Aleksandra Leonard on 8/10/19   4:24 pm CT
LATE ENTRY  1345  
CM MET WITH THE PATIENT AT THE BEDSIDE.  
SHE IS ANXIOUS FOR DISCHARGE. GABE, HER PRIMARY NURSE, AND HER DAUGHTER WERE 
ALSO PRESENT. CM EXPLAINED MY ROLE. THE PATIENT GAVE PERMISSION TO PROCEED 
WITH ASSESSMENT. SHE STATES SHE STAYS WITH HER DAUGHTER AT [address] [place] [address] 
IN [place]. GAVE PERMISSION FOR DAUGHTER TO ASSIST W/ DISCHARGE PLANNING. 
SHE STATES SHE IS ON SERVICES W/ [facility].  
SHE WISHES TO CONTINUE WITH THEIR CARE.  
PATIENT CHOICE FORM OBTAINED. SIGNED COPY TO THE PATIENT AND SIGNED COPY TO 
HER HARD COVER CHART.  
PHONE NUMBER- [phone]  
PCP- DR BARRETT  
PHARMACY IS [facility] AT THIS TIME.  
 
SHE HAS TRANSPORTATION TO HOME.  
SHE DENIES ANY OTHER NEEDS.   
DME- NEBULIZER.  
1700- TC TO [facility]. LEFT VM WITH MY CONTACT PHONE NUMBER 
FOR CALL BACK.  
FAXED REFERRAL
 DCPIA - Discharge Planning Initial Assessment
 
Updated by [id number]: Aleksandra Leonard on 8/10/19   5:15 pm
*  Is the patient Alert and Oriented?
Yes
*  How many steps to enter\exit or inside your home? THREE *  PCP DR VALENTIN BARRETT * 
Pharmacy
[facility]
*  Preadmission Environment
Home with Family
*  ADLs
Partial Dependent
*  Partial ADLs (Assistance needed)
Dressing
Medication Management
*  Equipment
Nebulizer
*  Verbal permission to speak to the caregivers and representatives has been 
obtained from the patient.
Yes
*  Community resources currently utilized
Home Health
*  Please name any agencies selected above.
[facility]- [phone]
*  Additional services required to return to the preadmission environment?
Yes
*  Can the patient safely return to the preadmission environment?
Yes
*  Has this patient been hospitalized within the prior 30 days at any 
hospital?
No
 
 
 
 
 
Coverage Notice
 
Reviewer: [email or web address] Paul Leonard
 
Notice Issued Date-Time: 08/10/2019  13:50
Notice Type: IM Discharge Notice
 
Notice Delivered To: Patient
Relationship to Patient: Self
Representative Name: 
 
 
Delivery Method: HAND - Hand Delivered
Agueda Days:
Prior Verbal Notification: 
 
Recipient Understood Notice: Yes
Recipient Signature: Yes
Med Rec Note Co-signed by Attending:
 
Coverage Notice Comment:  CM DISCUSSED DISCHARGE IMM. PATIENT HAD NO QUESTIONS
OR CONCERNS.  SIGNATURE TO 2 ORIGINALS. ONE SIGNED COPY TO THE PATIENT. ONE 
SIGNED COPY TO THE HARD COVER CHART.
 
 
Last DP export: 19   5:48 p
Patient Name: TELLY PRATER
Encounter #: [id number]
Page [id number]
 
 
 
 
 
Electronically Signed by GRAHAM BROWN on 19 at 0922
 
 
 
 
 
 
**All edits/amendments must be made on the electronic document**
 
DICTATION DATE: 19     : KIRA  19     
RPT#: [id number]                                DC DATE:08/10/19
                                               STATUS: DIS IN  
[id number] [place] [address]
***END OF REPORT***

## 2019-10-15 NOTE — NUR
DR. CARRERA NOTIFIED AND SITER ORDERED. SITTER AT BEDSIDE. NOTIFIED CHARGE
NURSE AND ATTENDING IN REGARDS OF ASSESSMENT FINDINGS. RRESOURCES GIVEN TO PT
AND SAFETY PLAN INITIATED.

## 2021-03-01 NOTE — NUR
BACK IN BED AFTER SHOWER, IV RECONNECTED RUNNING 1/2 NS @ 50 ML/HR. NO SIGNS
OF DISTRESS. BED LOW, 2 RAILS UP, CALL LIGHT IN REACH. Speech Therapy - IR  Communication/Cognition Treatment     RECOMMENDATIONS:   Patient continues to demonstrate acute communication and cognition deficits. Speech to continue to follow to address these deficits. Recommendations for Discharge: SLP: 24/7 supervision at this time pending progress      Recommendations discussed with RN who was informed of results of session     ASSESSMENT:   The patient was seen for communication and cognition treatment. Per discussion with OT, patient should work on sit to stand and stand to sit verbal sequence. Discussed the sequences (patient in bed due to dizziness). After repetition and large visual simplified cues, patient able to state the sequences with minimal cues. The patient is demonstrating good progress as evidenced by improved verbal sequencing for safety. At this time, the patient continues to demonstrate mild impairments in safety sequencing verbally which limit the performance of communication/cognition. Patient is currently needing  minimal cueing for communication/cognition. Further skilled speech therapy services are reasonable and necessary to address the above impairments and performance deficits     This patient participated in all scheduled speech therapy time with this therapist today. Prior Communication/Cognition:  Prior Auditory Comprehension: Impaired  Prior Verbal Expression: Intact  Prior Money Management: Impaired  Additional Comments: see progress note for prior SLP        EDUCATION:   On this date, the patient was educated on results, plan. The response to education was: Verbalizes understanding and Needs reinforcement    Plan for Next Session:  Plan for Next Session: Swallow: general/thin. Medications whole in puree (instruct not to chew). d/c speech for swallow. Comm/cog evaluation: severely [place] with pocket talker even. Use pocket talker AND dry erase board. Only write a few LARGE keywords at a time.   Continue staff education. Ensure OK use of soft touch call light. Assess safety for d/c home. Verbal problem solving will be best.    Frequency:  Frequency: 30 min 0/5 on 3/8 (3/1)    Barriers to Discharge:   SLP Identified Barriers to Discharge: cognition, dysphagia    Recommendations for Discharge:  Recommendations for Discharge: SLP: 24/7 supervision at this time pending progress (03/01/21 1112)    Subjective/Objective:  Communication/Cognition:  Subjective  Subjective Comments: Pt alert, pleasant. Severely [place]. Observation  Observation Comments: See plan of care note for details. Pocket talker utilized throughout the session. Problem Solving  Simple Problem Solving: Moderate impairment  Processing Speed: Mild impairment    GOALS:  SLP Goals  Short Term Goals to be Reviewed on : 03/03/21  STG are the Same as Discharge Goals: No  Goal Agreement: Patient agrees with goals and treatment plan  Problem Solving Short Term Goal 1  Problem Solving STG 1: Verbal problem solving of new learning tasks 80% minimal cues  Problem Solving Discharge Goal 1: Supervision verbal problem solving for d/c  Swallowing Short Term Goal 1  Swallowing STG 1: Tolerate general/thin <10% SS aspiration/penetration and stable lungs/tempsq  Swallowing Discharge Goal 1: Tolerate least restrictive oral diet    Total Treatment Time:  SLP Time Spent: 30 min    At the end of the therapy session, patient is in bed with the following safety measures in place: alarm system in place/re-engaged, call light within reach and equipment intact. Patient is located in the patient room and was handed off to Nursing Assistant. Patient's family was not present. Kit rTan